# Patient Record
Sex: FEMALE | URBAN - METROPOLITAN AREA
[De-identification: names, ages, dates, MRNs, and addresses within clinical notes are randomized per-mention and may not be internally consistent; named-entity substitution may affect disease eponyms.]

---

## 2021-11-29 ENCOUNTER — FOLLOW-UP (OUTPATIENT)
Dept: URBAN - METROPOLITAN AREA CLINIC 20 | Facility: CLINIC | Age: 31
Setting detail: DERMATOLOGY
End: 2021-11-29

## 2021-11-29 DIAGNOSIS — L57.0 ACTINIC KERATOSIS: ICD-10-CM

## 2021-11-29 PROCEDURE — 99213 OFFICE O/P EST LOW 20 MIN: CPT

## 2021-11-29 RX ORDER — SPIRONOLACTONE 100 MG/1
1 TABLET TABLET, FILM COATED ORAL ONCE A DAY
Qty: 90 | Refills: 3
Start: 2021-11-29

## 2021-12-01 ENCOUNTER — RX ONLY (RX ONLY)
Age: 31
End: 2021-12-01

## 2021-12-01 RX ORDER — SPIRONOLACTONE 100 MG/1
1 TABLET TABLET, FILM COATED ORAL ONCE A DAY
Qty: 90 | Refills: 4
Start: 2021-12-01

## 2022-05-05 ENCOUNTER — RX ONLY (RX ONLY)
Age: 32
End: 2022-05-05

## 2022-05-05 ENCOUNTER — APPOINTMENT (OUTPATIENT)
Dept: URBAN - METROPOLITAN AREA CLINIC 192 | Age: 32
Setting detail: DERMATOLOGY
End: 2022-05-09

## 2022-05-05 DIAGNOSIS — L29.8 OTHER PRURITUS: ICD-10-CM

## 2022-05-05 DIAGNOSIS — A60.9 ANOGENITAL HERPESVIRAL INFECTION, UNSPECIFIED: ICD-10-CM

## 2022-05-05 PROCEDURE — 88160 CYTOPATH SMEAR OTHER SOURCE: CPT

## 2022-05-05 PROCEDURE — OTHER ORDER TESTS: OTHER

## 2022-05-05 PROCEDURE — OTHER PRESCRIPTION: OTHER

## 2022-05-05 PROCEDURE — OTHER TZANCK SMEAR: OTHER

## 2022-05-05 PROCEDURE — OTHER INTRAMUSCULAR KENALOG: OTHER

## 2022-05-05 PROCEDURE — 99213 OFFICE O/P EST LOW 20 MIN: CPT | Mod: 25

## 2022-05-05 PROCEDURE — 96372 THER/PROPH/DIAG INJ SC/IM: CPT

## 2022-05-05 PROCEDURE — OTHER SEXUALLY TRANSMITTED INFECTION COUNSELING: OTHER

## 2022-05-05 RX ORDER — VALACYCLOVIR HYDROCHLORIDE 500 MG/1
TABLET, FILM COATED ORAL
Qty: 45 | Refills: 0 | Status: ERX | COMMUNITY
Start: 2022-05-05

## 2022-05-05 RX ORDER — VALACYCLOVIR HYDROCHLORIDE 500 MG/1
1 TABLET, FILM COATED ORAL BID
Qty: 14 | Refills: 11 | Status: ERX | COMMUNITY
Start: 2022-05-05

## 2022-05-05 ASSESSMENT — LOCATION DETAILED DESCRIPTION DERM
LOCATION DETAILED: RIGHT BUTTOCK
LOCATION DETAILED: GENITALIA

## 2022-05-05 ASSESSMENT — LOCATION SIMPLE DESCRIPTION DERM
LOCATION SIMPLE: RIGHT BUTTOCK
LOCATION SIMPLE: GENITALIA

## 2022-05-05 ASSESSMENT — LOCATION ZONE DERM: LOCATION ZONE: TRUNK

## 2022-05-05 NOTE — PROCEDURE: ORDER TESTS
Bill For Surgical Tray: no
Expected Date Of Service: 05/05/2022
Lab Facility: [id number]
Billing Type: Third-Party Bill

## 2022-05-05 NOTE — PROCEDURE: TZANCK SMEAR
Body Location Override (Optional - Billing Will Still Be Based On Selected Body Map Location If Applicable): L labium, rectal area
Detail Level: Detailed
Billin: Cytopathology, smears, any other source; screening and interpretation
Showing: multi-nucleated giant cells

## 2022-05-05 NOTE — PROCEDURE: SEXUALLY TRANSMITTED INFECTION COUNSELING
Patient Interested In Sti Testing?: No
Genital Herpes Counseling: Herpes infection on the genitalia or buttocks is considered a sexually transmitted infection. I recommend screening for syphilis and HIV.
Hiv Counseling: HIV can be a sexually transmitted infection. I recommend screening for syphilis.
Chlamydia Counseling: Chlamydia is a sexually transmitted infection. I recommend screening for HIV, syphilis and gonorrhea.
Patient Not Interested Counseling: The patient is not interested in testing for sexually transmitted infections at this time.
Molluscum Counseling: Molluscum lesions on the genitalia in individuals who are sexually active are considered a sexually transmitted infection. I recommend screening for syphilis and HIV.
Condyloma Counseling: Warts on the genitalia in individuals who are sexually active are considered a sexually transmitted infection. I recommend screening for syphilis and HIV.
Trichomonas Counseling: Trichomonas is a sexually transmitted infection. I recommend screening for HIV, syphilis, gonorrhea and chlamydia.
Reactive Arthritis Counseling: This condition can be caused by a sexually transmitted infection. I recommend screening for HIV, syphilis, gonorrhea and chlamydia.
Chancroid Counseling: Chancroid is a sexually transmitted infection. I recommend screening for syphilis and HIV.
Generic Sti Counseling: I recommend screening for syphilis and HIV.
Detail Level: Detailed
Gonorrhea Counseling: Gonorrhea is a sexually transmitted infection. I recommend screening for HIV, syphilis and chlamydia.
Syphilis Counseling: Syphilis is a sexually transmitted infection. I recommend screening for HIV.
Gonorrhea Counseling: Granuloma inguinale is a sexually transmitted infection. I recommend screening for HIV, syphilis, gonorrhea and chlamydia.

## 2022-05-09 RX ORDER — VALACYCLOVIR HYDROCHLORIDE 500 MG/1
TABLET, FILM COATED ORAL QD
Qty: 30 | Refills: 11 | Status: CANCELLED
Stop reason: SDUPTHER

## 2023-01-25 ENCOUNTER — RX ONLY (RX ONLY)
Age: 33
End: 2023-01-25

## 2023-01-25 RX ORDER — VALACYCLOVIR HYDROCHLORIDE 500 MG/1
TABLET, FILM COATED ORAL
Qty: 90 | Refills: 1 | Status: ERX

## 2023-01-31 ENCOUNTER — RX ONLY (RX ONLY)
Age: 33
End: 2023-01-31

## 2023-01-31 RX ORDER — SPIRONOLACTONE 100 MG/1
TABLET, FILM COATED ORAL
Qty: 90 | Refills: 1 | Status: ERX | COMMUNITY
Start: 2023-01-31

## 2023-05-02 ENCOUNTER — APPOINTMENT (OUTPATIENT)
Dept: URBAN - METROPOLITAN AREA CLINIC 192 | Age: 33
Setting detail: DERMATOLOGY
End: 2023-05-07

## 2023-05-02 DIAGNOSIS — L70.0 ACNE VULGARIS: ICD-10-CM

## 2023-05-02 PROCEDURE — 11900 INJECT SKIN LESIONS </W 7: CPT

## 2023-05-02 PROCEDURE — OTHER INTRALESIONAL KENALOG: OTHER

## 2023-05-02 ASSESSMENT — LOCATION SIMPLE DESCRIPTION DERM: LOCATION SIMPLE: RIGHT CHEEK

## 2023-05-02 ASSESSMENT — LOCATION DETAILED DESCRIPTION DERM: LOCATION DETAILED: RIGHT INFERIOR MEDIAL MALAR CHEEK

## 2023-05-02 ASSESSMENT — LOCATION ZONE DERM: LOCATION ZONE: FACE

## 2023-05-02 NOTE — HPI: PIMPLES (ACNE)
Is This A New Presentation, Or A Follow-Up?: Acne
Additional Comments (Use Complete Sentences): Pt requesting [place] kenalog injection

## 2023-11-14 ENCOUNTER — RX ONLY (RX ONLY)
Age: 33
End: 2023-11-14

## 2023-11-14 RX ORDER — SPIRONOLACTONE 100 MG/1
TABLET, FILM COATED ORAL
Qty: 90 | Refills: 3 | Status: ERX

## 2023-11-30 ENCOUNTER — RX ONLY (RX ONLY)
Age: 33
End: 2023-11-30

## 2023-11-30 RX ORDER — VALACYCLOVIR HYDROCHLORIDE 500 MG/1
TABLET, FILM COATED ORAL
Qty: 90 | Refills: 1 | Status: ERX